# Patient Record
Sex: MALE | Race: BLACK OR AFRICAN AMERICAN | NOT HISPANIC OR LATINO | Employment: FULL TIME | URBAN - METROPOLITAN AREA
[De-identification: names, ages, dates, MRNs, and addresses within clinical notes are randomized per-mention and may not be internally consistent; named-entity substitution may affect disease eponyms.]

---

## 2017-11-26 ENCOUNTER — HOSPITAL ENCOUNTER (OUTPATIENT)
Dept: RADIOLOGY | Facility: MEDICAL CENTER | Age: 32
End: 2017-11-26

## 2017-11-26 ENCOUNTER — HOSPITAL ENCOUNTER (EMERGENCY)
Facility: MEDICAL CENTER | Age: 32
End: 2017-11-26
Attending: EMERGENCY MEDICINE
Payer: COMMERCIAL

## 2017-11-26 VITALS
WEIGHT: 240.3 LBS | HEART RATE: 84 BPM | TEMPERATURE: 97.8 F | SYSTOLIC BLOOD PRESSURE: 122 MMHG | RESPIRATION RATE: 15 BRPM | OXYGEN SATURATION: 94 % | DIASTOLIC BLOOD PRESSURE: 71 MMHG | BODY MASS INDEX: 30.84 KG/M2 | HEIGHT: 74 IN

## 2017-11-26 DIAGNOSIS — R07.9 CHEST PAIN, UNSPECIFIED TYPE: ICD-10-CM

## 2017-11-26 LAB
APTT PPP: 28.7 SEC (ref 24.7–36)
DEPRECATED D DIMER PPP IA-ACNC: <200 NG/ML(D-DU)
EKG IMPRESSION: NORMAL
INR PPP: 1.01 (ref 0.87–1.13)
LV EJECT FRACT  99904: 70
LV EJECT FRACT MOD 2C 99903: 75.26
LV EJECT FRACT MOD 4C 99902: 51.23
LV EJECT FRACT MOD BP 99901: 65.4
PROTHROMBIN TIME: 13 SEC (ref 12–14.6)
TROPONIN I SERPL-MCNC: <0.01 NG/ML (ref 0–0.04)

## 2017-11-26 PROCEDURE — 85379 FIBRIN DEGRADATION QUANT: CPT

## 2017-11-26 PROCEDURE — 93306 TTE W/DOPPLER COMPLETE: CPT | Mod: 26 | Performed by: INTERNAL MEDICINE

## 2017-11-26 PROCEDURE — 99284 EMERGENCY DEPT VISIT MOD MDM: CPT

## 2017-11-26 PROCEDURE — 93005 ELECTROCARDIOGRAM TRACING: CPT | Performed by: EMERGENCY MEDICINE

## 2017-11-26 PROCEDURE — 93306 TTE W/DOPPLER COMPLETE: CPT

## 2017-11-26 PROCEDURE — 85610 PROTHROMBIN TIME: CPT

## 2017-11-26 PROCEDURE — 85730 THROMBOPLASTIN TIME PARTIAL: CPT

## 2017-11-26 PROCEDURE — 36415 COLL VENOUS BLD VENIPUNCTURE: CPT

## 2017-11-26 PROCEDURE — 93005 ELECTROCARDIOGRAM TRACING: CPT

## 2017-11-26 PROCEDURE — 84484 ASSAY OF TROPONIN QUANT: CPT

## 2017-11-26 ASSESSMENT — PAIN SCALES - GENERAL: PAINLEVEL_OUTOF10: 0

## 2017-11-26 NOTE — ED NOTES
"33 y/o male ambulate to triage   Chief Complaint   Patient presents with   • Chest Pain     x 2 weeks \"off and on\"    • Sent by MD     ER in [place]      Pt states he had \"normal\" lab work in [place].  Was sent here for \"abnormal EKG\"  "

## 2017-11-26 NOTE — ED PROVIDER NOTES
"ED Provider Note    CHIEF COMPLAINT  Chief Complaint   Patient presents with   • Chest Pain     x 2 weeks \"off and on\"    • Sent by MD CORRIGAN in [facility]  Roberto Lopez is a 32 y.o. male who presents to the emergency departmentComplaining of chest pain. He is at intermittent chest pain for last 2 weeks. The pain occasionally sharp and occasionally is dull, there are no alleviating exacerbating factors, there is no radiation of the pain to his neck, to his doctors on his back. He was seen at the hospital in [place] earlier today and had an abnormal EKG and left AMA to come to our facility by private vehicle. He states since that time he has had one episode of chest discomfort that is dull in nature.    REVIEW OF SYSTEMS  Positives as above. Pertinent negatives include fever, cough, swelling of his lower extremities, rash, nausea, vomiting   All other review of systems are negative  Cardiac Risk Factors:  No Age > 65  No Aspirin use within 7 days  No prior history of coronary artery disease  No diabetes  No hyperlipidemia   No hypertension  No obesity  No family history of coronary artery disease at a young age <56 yo  No tobacco use   No drugs (methamphetamine or cocaine)  No history of aortic aneurysm   No history of aortic dissection   No history of deep vein thrombosis or pulmonary embolism     PAST MEDICAL HISTORY  History reviewed. No pertinent past medical history.    FAMILY HISTORY  Noncontributory    SOCIAL HISTORY  Social History     Social History   • Marital status:      Spouse name: N/A   • Number of children: N/A   • Years of education: N/A     Social History Main Topics   • Smoking status: Never Smoker   • Smokeless tobacco: Never Used   • Alcohol use Not on file   • Drug use: No   • Sexual activity: Not on file     Other Topics Concern   • Not on file     Social History Narrative   • No narrative on file       SURGICAL HISTORY  History reviewed. No pertinent surgical " "history.    CURRENT MEDICATIONS  Home Medications    **Home medications have not yet been reviewed for this encounter**         ALLERGIES  No Known Allergies    PHYSICAL EXAM  VITAL SIGNS: /71   Pulse 84   Temp 36.6 °C (97.8 °F)   Resp 15   Ht 1.88 m (6' 2\")   Wt 109 kg (240 lb 4.8 oz)   SpO2 94%   BMI 30.85 kg/m²      Constitutional: Well developed, Well nourished, No acute distress, Non-toxic appearance.   Eyes: PERRLA, EOMI, Conjunctiva normal, No discharge.   Cardiovascular: Normal heart rate, Normal rhythm, No murmurs, No rubs, No gallops, and intact distal pulses.   Thorax & Lungs:  No respiratory distress, no rales, no rhonchi, No wheezing, No chest wall tenderness.   Abdomen: Bowel sounds normal, Soft, No tenderness, No guarding, No rebound, No pulsatile masses.   Skin: Warm, Dry, No erythema, No rash.   Extremities: Full range of motion, no deformity, no edema.  Neurologic: Alert & oriented x 3, No focal deficits noted, acting appropriately on exam.  Psychiatric: Affect normal for clinical presentation.      RADIOLOGY/PROCEDURES  [facility]  Results for orders placed or performed during the hospital encounter of 17   TROPONIN   Result Value Ref Range    Troponin I <0.01 0.00 - 0.04 ng/mL   D-DIMER   Result Value Ref Range    D-Dimer Screen <200 <250 ng/mL(D-DU)   PROTHROMBIN TIME   Result Value Ref Range    PT 13.0 12.0 - 14.6 sec    INR 1.01 0.87 - 1.13   PTT   Result Value Ref Range    APTT 28.7 24.7 - 36.0 sec   EKG (NOW)   Result Value Ref Range    Report       [facility] Emergency Dept.    Test Date:  2017  Pt Name:    HERMELINDA ALBERT                Department: ER  MRN:        [id number]                      Room:  Gender:     M                            Technician: [id number]  :        1985                   Requested By:ER TRIAGE PROTOCOL  Order #:    [id number]                    Donnell MD: RAJI FRAGOSO" DO AMADOU    Measurements  Intervals                                Axis  Rate:       90                           P:          68  TN:         156                          QRS:        92  QRSD:       90                           T:          -41  QT:         340  QTc:        416    Interpretive Statements  SINUS RHYTHM  CONSIDER LEFT ATRIAL ABNORMALITY  BORDERLINE RIGHT AXIS DEVIATION  ABNORMAL T, CONSIDER ISCHEMIA, INFERIOR LEADS  No previous ECG available for comparison    Electronically Signed On 11- 17:21:52 PST by RAJI TOBAR DO     ECHOCARDIOGRAM COMP W/O CONT   Result Value Ref Range    Eject.Frac. MOD BP 65.4     Eject.Frac. MOD 4C 51.23     Eject.Frac. MOD 2C 75.26     Left Ventrical Ejection Fraction 70      No significant EKG changes from EKG completed earlier today at the outlying facility.  COURSE & MEDICAL DECISION MAKING  Pertinent Labs & Imaging studies reviewed. (See chart for details)  This is a [facility] 32-year-old male presents with chest discomfort, chest pain as well as abnormal EKG. He has have hyperacute T waves and J-point elevation in V2 through V4 as well as ST depression with inverted T waves in leads 2, 3 and aVF. I discussed this with cardiologist Dr. Christiano Vincent and he does believe the patient has early repolarization. He has for stat echocardiogram to be completed secondary to the patient's LVH is such a young age. He did find the patient did have evidence of apical hypertrophy. The patient did not want to take beta blockers and was evaluated with cardiology and states that he'll be following up with cardiology within one week. Discussed the patient Dr. Vincent who agrees the patient is appropriate for outpatient evaluation and management, we do not believe he is experiencing ischemia resulting in his EKG changes, the patient is a very low cardiac risk profile. The patient on reevaluation prior to discharge is asymptomatic, he understands the need to return to  the emergency department as increasing symptomatology and will be L following up with Dr. Vincent as an outpatient.  There are no discharge medications for this patient.      FINAL IMPRESSION     1. Chest pain, unspecified type       The patient will return for new or worsening symptoms and is stable at the time of discharge.    The patient is referred to a primary physician for blood pressure management, diabetic screening, and for all other preventative health concerns.    DISPOSITION:  Patient will be discharged home in stable condition.    FOLLOW UP:  Christiano Vincent M.D.  [address] [place] [address] [address]  [facility] [address]  [phone]    Schedule an appointment as soon as possible for a visit in 1 week      [facility], Emergency Dept  [address] [facility] [id number] [phone]    If symptoms worsen                 Electronically signed by: Antonio Boateng, 11/26/2017 11:57 AM

## 2017-11-26 NOTE — DISCHARGE INSTRUCTIONS
Chest Pain, Nonspecific  It is often hard to give a specific diagnosis for the cause of chest pain. There is always a chance that your pain could be related to something serious, like a heart attack or a blood clot in the lungs. You need to follow up with your caregiver for further evaluation. More lab tests or other studies such as X-rays, electrocardiography, stress testing, or cardiac imaging may be needed to find the cause of your pain.  Most of the time, nonspecific chest pain improves within 2 to 3 days with rest and mild pain medicine. For the next few days, avoid physical exertion or activities that bring on pain. Do not smoke. Avoid drinking alcohol. Call your caregiver for routine follow-up as advised.   SEEK IMMEDIATE MEDICAL CARE IF:  · You develop increased chest pain or pain that radiates to the arm, neck, jaw, back, or abdomen.   · You develop shortness of breath, increased coughing, or you start coughing up blood.   · You have severe back or abdominal pain, nausea, or vomiting.   · You develop severe weakness, fainting, fever, or chills.   Document Released: 12/18/2006 Document Revised: 03/11/2013 Document Reviewed: 06/06/2008  [employer]® Patient Information ©2013 [employer].

## 2017-11-26 NOTE — CONSULTS
"Cardiology New Consult Note    Date of note:    11/26/2017      Consulting Physician: Antonio Boateng D.O.    Patient ID:    Name:   Roberto Lopez   YOB: 1985  Age:   32 y.o.  male   MRN:   [id number]      Consult question: Evaluation of chest pain    HPI:  Roberto Lopez is a 32-year-old man with no past medical history who presents with 2 weeks of chest discomfort. He describes it as midsternal, sharp, lasting less than 5 minutes and occurring every 3-4 days. It does not radiate, and he can identify no other palliating or provoking factors including an especially exercise. He strictly denies any syncope, and has no other cardiovascular complaints.    ROS: All others reviewed and negative.    PMH (problem list reviewed, pertinent to this consultation):     1. Apical hypertrophic obstructive cardiomyopathy established this ER visit    Outpatient Medications:     None    Allergies: Patient has no known allergies.    Family History: family history is not on file. He has no family history of heart disease, and specifically denies any family history of sudden death.    Social History:  reports that he has never smoked. He has never used smokeless tobacco. He reports that he does not use drugs.    Physical Exam    Body mass index is 30.85 kg/m².  Blood pressure 122/71, pulse 82, temperature 36.6 °C (97.8 °F), resp. rate 17, height 1.88 m (6' 2\"), weight 109 kg (240 lb 4.8 oz), SpO2 95 %.  Vitals:    11/26/17 1103 11/26/17 1117 11/26/17 1130 11/26/17 1216   BP: 122/71      Pulse: (!) 102 93 82 82   Resp: 18 17 (!) 21 17   Temp:       SpO2: 98% 97% 96% 95%   Weight:       Height:         Oxygen Therapy:  Pulse Oximetry: 95 %, O2 Delivery: None (Room Air)    General:Pleasant, young, -American man in no distress  HEENT: No jugular venous distension sitting upright, pupils equal, round and reactive to light, extraocular movements intact  Heart: Normal rate, regular rhythm, no murmurs, no " rubs or gallops  Lungs: Clear to auscultation bilaterally  Abdomen: Bowel sounds positive, non tender, non distended, no masses   Extremities: No lower extremity edema, no clubbing, no cyanosis  Neurological: Alert and oriented x 3, no focal deficit  Skin: no rashes    Labs (reviewed and notable for):     CBC, 2017, outside labs: To be BC 4.2, hemoglobin 15.6, platelets 204  Chemistry panel, 2017, again outside labs: Sodium 142, potassium 4.1, chloride 100, CO2 26, BUN 12, creatinine 1.1, glucose 113  LFTs: AST 30, ALT 51, alkaline phosphatase 62, albumin 4.5, total protein 7.8, total bilirubin 0.3  TSH: 1.47  Cardiac markers: Troponin less than 0.01 ng/mL (again outside lab)    EKG: Shows sinus rhythm, left ventricular hypertrophy and repolarization abnormality with inferior ST depression and T-wave inversion    Echo: Read by me from today shows normal left ventricular ejection fraction, apical hypertrophy with a trivial apical gradient, and no valve disease      Impression and Medical Decision Makin. Hypertrophic obstructive cardiomyopathy: He is minimally symptomatic and has no high-risk features including family history of sudden cardiac death, syncope, or arrhythmia seen. I discussed with him the potential use of beta blockers for alleviation of his chest discomfort, and he preferred no medication at this point, which I do think is reasonable. I strictly reinforced that if he has any syncope especially with exercise he needs reevaluation immediately. He agreed, and will see us in clinic. I gave him my card.    He is from my perspective stable for discharge without additional workup.    This note was dictated using Dragon speech recognition software.    Thank you for allowing me to participate in the care of this patient.  Please call if any clarification will be helpful.      Christiano Vincent MD  Cardiologist, [facility] [place]

## 2017-11-26 NOTE — ED NOTES
Patient denies pain on arrival to ER but reports intermittent non radiating chest pain x 2 weeks. Pain typically lasts 5-10 minutes then resolves.

## 2021-09-21 ENCOUNTER — APPOINTMENT (OUTPATIENT)
Dept: RADIOLOGY | Facility: MEDICAL CENTER | Age: 36
End: 2021-09-21
Attending: EMERGENCY MEDICINE
Payer: COMMERCIAL

## 2021-09-21 ENCOUNTER — APPOINTMENT (OUTPATIENT)
Dept: RADIOLOGY | Facility: MEDICAL CENTER | Age: 36
End: 2021-09-21
Payer: COMMERCIAL

## 2021-09-21 ENCOUNTER — HOSPITAL ENCOUNTER (EMERGENCY)
Facility: MEDICAL CENTER | Age: 36
End: 2021-09-21
Attending: EMERGENCY MEDICINE
Payer: COMMERCIAL

## 2021-09-21 VITALS
HEIGHT: 74 IN | BODY MASS INDEX: 30.81 KG/M2 | HEART RATE: 90 BPM | TEMPERATURE: 98.4 F | WEIGHT: 240.08 LBS | OXYGEN SATURATION: 99 % | RESPIRATION RATE: 16 BRPM | SYSTOLIC BLOOD PRESSURE: 124 MMHG | DIASTOLIC BLOOD PRESSURE: 86 MMHG

## 2021-09-21 DIAGNOSIS — R07.9 ACUTE CHEST PAIN: ICD-10-CM

## 2021-09-21 LAB
ALBUMIN SERPL BCP-MCNC: 4.7 G/DL (ref 3.2–4.9)
ALBUMIN/GLOB SERPL: 1.5 G/DL
ALP SERPL-CCNC: 69 U/L (ref 30–99)
ALT SERPL-CCNC: 31 U/L (ref 2–50)
ANION GAP SERPL CALC-SCNC: 12 MMOL/L (ref 7–16)
AST SERPL-CCNC: 23 U/L (ref 12–45)
BASOPHILS # BLD AUTO: 0.5 % (ref 0–1.8)
BASOPHILS # BLD: 0.03 K/UL (ref 0–0.12)
BILIRUB SERPL-MCNC: 0.4 MG/DL (ref 0.1–1.5)
BUN SERPL-MCNC: 10 MG/DL (ref 8–22)
CALCIUM SERPL-MCNC: 9.6 MG/DL (ref 8.5–10.5)
CHLORIDE SERPL-SCNC: 101 MMOL/L (ref 96–112)
CO2 SERPL-SCNC: 26 MMOL/L (ref 20–33)
CREAT SERPL-MCNC: 1.08 MG/DL (ref 0.5–1.4)
EKG IMPRESSION: NORMAL
EOSINOPHIL # BLD AUTO: 0.05 K/UL (ref 0–0.51)
EOSINOPHIL NFR BLD: 0.9 % (ref 0–6.9)
ERYTHROCYTE [DISTWIDTH] IN BLOOD BY AUTOMATED COUNT: 41.8 FL (ref 35.9–50)
GLOBULIN SER CALC-MCNC: 3.2 G/DL (ref 1.9–3.5)
GLUCOSE SERPL-MCNC: 98 MG/DL (ref 65–99)
HCT VFR BLD AUTO: 44.3 % (ref 42–52)
HGB BLD-MCNC: 15.3 G/DL (ref 14–18)
IMM GRANULOCYTES # BLD AUTO: 0.01 K/UL (ref 0–0.11)
IMM GRANULOCYTES NFR BLD AUTO: 0.2 % (ref 0–0.9)
LYMPHOCYTES # BLD AUTO: 2.09 K/UL (ref 1–4.8)
LYMPHOCYTES NFR BLD: 37.3 % (ref 22–41)
MCH RBC QN AUTO: 28.2 PG (ref 27–33)
MCHC RBC AUTO-ENTMCNC: 34.5 G/DL (ref 33.7–35.3)
MCV RBC AUTO: 81.7 FL (ref 81.4–97.8)
MONOCYTES # BLD AUTO: 0.36 K/UL (ref 0–0.85)
MONOCYTES NFR BLD AUTO: 6.4 % (ref 0–13.4)
NEUTROPHILS # BLD AUTO: 3.07 K/UL (ref 1.82–7.42)
NEUTROPHILS NFR BLD: 54.7 % (ref 44–72)
NRBC # BLD AUTO: 0 K/UL
NRBC BLD-RTO: 0 /100 WBC
PLATELET # BLD AUTO: 227 K/UL (ref 164–446)
PMV BLD AUTO: 10.3 FL (ref 9–12.9)
POTASSIUM SERPL-SCNC: 3.7 MMOL/L (ref 3.6–5.5)
PROT SERPL-MCNC: 7.9 G/DL (ref 6–8.2)
RBC # BLD AUTO: 5.42 M/UL (ref 4.7–6.1)
SODIUM SERPL-SCNC: 139 MMOL/L (ref 135–145)
TROPONIN T SERPL-MCNC: 8 NG/L (ref 6–19)
WBC # BLD AUTO: 5.6 K/UL (ref 4.8–10.8)

## 2021-09-21 PROCEDURE — 93005 ELECTROCARDIOGRAM TRACING: CPT | Performed by: EMERGENCY MEDICINE

## 2021-09-21 PROCEDURE — 99283 EMERGENCY DEPT VISIT LOW MDM: CPT

## 2021-09-21 PROCEDURE — 84484 ASSAY OF TROPONIN QUANT: CPT

## 2021-09-21 PROCEDURE — 85025 COMPLETE CBC W/AUTO DIFF WBC: CPT

## 2021-09-21 PROCEDURE — 93005 ELECTROCARDIOGRAM TRACING: CPT

## 2021-09-21 PROCEDURE — 71045 X-RAY EXAM CHEST 1 VIEW: CPT

## 2021-09-21 PROCEDURE — 80053 COMPREHEN METABOLIC PANEL: CPT

## 2021-09-21 ASSESSMENT — LIFESTYLE VARIABLES: DO YOU DRINK ALCOHOL: NO

## 2021-09-21 NOTE — ED TRIAGE NOTES
"Chief Complaint   Patient presents with   • Chest Pain     Pt reports sharp, upper mid/left chest pain and pressure that does not radiate. Pt reports pain is worse on inspiration, swallowing, bending or turning his neck, 9/10.      /86   Pulse 100   Temp 36.7 °C (98.1 °F) (Temporal)   Resp 16   Ht 1.88 m (6' 2\")   Wt 109 kg (240 lb 1.3 oz)   SpO2 99%   BMI 30.82 kg/m²      Pt is ambulatory in and out of triage with a steady gait. Appropriate PPE worn throughout entire encounter. Pt placed back in the lobby and educated about triage process.  EKG performed in triage.   "

## 2021-09-22 NOTE — ED PROVIDER NOTES
ED Provider Note    CHIEF COMPLAINT  Chief Complaint   Patient presents with   • Chest Pain     Pt reports sharp, upper mid/left chest pain and pressure that does not radiate. Pt reports pain is worse on inspiration, swallowing, bending or turning his neck, 9/10.         HPI    Primary care provider: Pcp Pt States None   History obtained from: Patient  History limited by: None     Roberto Lopez is a 36 y.o. male who presents to the ED complaining of mid to left upper chest pain described as sharp that started early this morning while he was at work.  He reports pain also in his neck and feels like he has difficulty swallowing.  He states that the pain is worse when he is trying to take a deep breath, swallow or bending forward.  He denies recent injury or trauma.  Denies fever/cough/shortness of breath or difficulty breathing/nausea/vomiting/diarrhea/dysuria/rash/swelling.  He reports that he otherwise feels pretty good.  He denies known history of heart problems or heart problems in the family.  No history of blood clots.  He reports that he does suffer from chronic sinus issues for the past several years and frequently has postnasal drip down into his throat that causes him to have throat discomfort.  He did see the ENT a few weeks ago and no procedure was performed.    REVIEW OF SYSTEMS  Please see HPI for pertinent positives/negatives.  All other systems reviewed and are negative.     PAST MEDICAL HISTORY  No past medical history on file.     SURGICAL HISTORY  History reviewed. No pertinent surgical history.     SOCIAL HISTORY  Social History     Tobacco Use   • Smoking status: Never Smoker   • Smokeless tobacco: Never Used   Vaping Use   • Vaping Use: Never used   Substance and Sexual Activity   • Alcohol use: Yes     Comment: occasionally   • Drug use: No   • Sexual activity: Not on file        FAMILY HISTORY  Family History   Problem Relation Age of Onset   • Heart Disease Neg Hx         CURRENT  "MEDICATIONS  Home Medications     Reviewed by Veronica Dee R.N. (Registered Nurse) on 09/21/21 at 1500  Med List Status: Not Addressed   Medication Last Dose Status        Patient Royal Taking any Medications                        ALLERGIES  No Known Allergies     PHYSICAL EXAM  VITAL SIGNS: /86   Pulse 90   Temp 36.9 °C (98.4 °F) (Temporal)   Resp 16   Ht 1.88 m (6' 2\")   Wt 109 kg (240 lb 1.3 oz)   SpO2 99%   BMI 30.82 kg/m²  @FRANK[[id number]::@     Pulse ox interpretation: 99% I interpret this pulse ox as normal     Cardiac monitor interpretation: Sinus rhythm with heart rate in the 90s as interpreted by me.  The patient presented with chest pain and cardiac monitor was ordered to monitor for dysrhythmia.    Constitutional: Well developed, well nourished, alert in no apparent distress, nontoxic appearance    HENT: No external signs of trauma, normocephalic, mask on due to COVID-19 pandemic  Eyes: PERRL, conjunctiva without erythema, no discharge, no icterus    Neck: Soft and supple, trachea midline, no stridor, no tenderness, no LAD, no JVD, good ROM    Cardiovascular: Regular rate and rhythm, no murmurs/rubs/gallops, strong distal pulses and good perfusion    Thorax & Lungs: No respiratory distress, CTAB   Abdomen: Soft, nontender, nondistended, no guarding, no rebound, normal BS    Back: No CVAT    Extremities: No cyanosis, no edema, no gross deformity, good ROM, no tenderness, intact distal pulses with brisk cap refill    Skin: Warm, dry, no pallor/cyanosis, no rash noted    Lymphatic: No lymphadenopathy noted    Neuro: A/O times 3, no focal deficits noted    Psychiatric: Cooperative, normal mood and affect, normal judgement, appropriate for clinical situation        DIAGNOSTIC STUDIES / PROCEDURES    EKG  12 Lead EKG obtained at 1452 and interpreted by me:   Rate: 86   Rhythm: Sinus rhythm   Ectopy: None  Intervals: Normal   Axis: RAD  QRS: Normal   ST segments: Normal  T Waves: " Normal    Clinical Impression: Sinus rhythm without acute ischemic changes or dysrhythmia       LABS  All labs reviewed by me.     Results for orders placed or performed during the hospital encounter of 09/21/21   CBC with Differential   Result Value Ref Range    WBC 5.6 4.8 - 10.8 K/uL    RBC 5.42 4.70 - 6.10 M/uL    Hemoglobin 15.3 14.0 - 18.0 g/dL    Hematocrit 44.3 42.0 - 52.0 %    MCV 81.7 81.4 - 97.8 fL    MCH 28.2 27.0 - 33.0 pg    MCHC 34.5 33.7 - 35.3 g/dL    RDW 41.8 35.9 - 50.0 fL    Platelet Count 227 164 - 446 K/uL    MPV 10.3 9.0 - 12.9 fL    Neutrophils-Polys 54.70 44.00 - 72.00 %    Lymphocytes 37.30 22.00 - 41.00 %    Monocytes 6.40 0.00 - 13.40 %    Eosinophils 0.90 0.00 - 6.90 %    Basophils 0.50 0.00 - 1.80 %    Immature Granulocytes 0.20 0.00 - 0.90 %    Nucleated RBC 0.00 /100 WBC    Neutrophils (Absolute) 3.07 1.82 - 7.42 K/uL    Lymphs (Absolute) 2.09 1.00 - 4.80 K/uL    Monos (Absolute) 0.36 0.00 - 0.85 K/uL    Eos (Absolute) 0.05 0.00 - 0.51 K/uL    Baso (Absolute) 0.03 0.00 - 0.12 K/uL    Immature Granulocytes (abs) 0.01 0.00 - 0.11 K/uL    NRBC (Absolute) 0.00 K/uL   Complete Metabolic Panel (CMP)   Result Value Ref Range    Sodium 139 135 - 145 mmol/L    Potassium 3.7 3.6 - 5.5 mmol/L    Chloride 101 96 - 112 mmol/L    Co2 26 20 - 33 mmol/L    Anion Gap 12.0 7.0 - 16.0    Glucose 98 65 - 99 mg/dL    Bun 10 8 - 22 mg/dL    Creatinine 1.08 0.50 - 1.40 mg/dL    Calcium 9.6 8.5 - 10.5 mg/dL    AST(SGOT) 23 12 - 45 U/L    ALT(SGPT) 31 2 - 50 U/L    Alkaline Phosphatase 69 30 - 99 U/L    Total Bilirubin 0.4 0.1 - 1.5 mg/dL    Albumin 4.7 3.2 - 4.9 g/dL    Total Protein 7.9 6.0 - 8.2 g/dL    Globulin 3.2 1.9 - 3.5 g/dL    A-G Ratio 1.5 g/dL   Troponin   Result Value Ref Range    Troponin T 8 6 - 19 ng/L   ESTIMATED GFR   Result Value Ref Range    GFR If African American >60 >60 mL/min/1.73 m 2    GFR If Non African American >60 >60 mL/min/1.73 m 2   EKG   Result Value Ref Range    Report        [facility] Emergency Dept.    Test Date:  2021  Pt Name:    HERMELINDA ALBERT                Department: ER  MRN:        [id number]                      Room:  Gender:     Male                         Technician: TCM  :        1985                   Requested By:ER TRIAGE PROTOCOL  Order #:    [id number]                    Reading MD:    Measurements  Intervals                                Axis  Rate:       86                           P:          75  AK:         164                          QRS:        95  QRSD:       82                           T:          4  QT:         352  QTc:        421    Interpretive Statements  SINUS RHYTHM  BORDERLINE RIGHT AXIS DEVIATION  Compared to ECG 2017 10:37:59  T-wave abnormality no longer present  Possible ischemia no longer present          RADIOLOGY  The radiologist's interpretation of all radiological studies have been reviewed by me.     DX-CHEST-PORTABLE (1 VIEW)   Final Result      No acute cardiopulmonary process is seen.             COURSE & MEDICAL DECISION MAKING  Nursing notes, VS, PMSFHx reviewed in chart.     Review of past medical records shows the patient was last seen in this ED 2017 for chest pain and had stat echo performed which showed apical hypertrophy.  Patient was to follow-up with cardiology.      Differential diagnoses considered include but are not limited to: AMI, dissection, PE, pneumothorax, pneumomediastinum, CHF/pulm edema, pericardial effusion/tamponade, myocarditis, pericarditis, pleurisy, costochondritis, mediastinitis, esophageal spasm, GERD, gastritis, PUD, hiatal hernia, pancreatitis, muscle strain, neuropathy      The patient was ruled out for PE by PERC criteria:    Age < 50  HR < 100  RA sat > 94%  No hx of DVT/PE  No hemoptysis  No recent surgery/trauma (4 weeks)  No estrogen/BCP  No unilateral leg swelling        Pt risk-stratified as low risk for MACE in the next 6 weeks by HEART Score  (0-3): 1    HISTORY  Highly suspicious +2  Moderately suspicious +1  Slightly suspicious 0    EKG  Significant ST depression +2  Nonspecific repolarization disturbance +1  Normal 0    AGE  ? 65 +2  45-65 +1  < 45 0    RISK FACTORS  Hypercholesterolemia, HTN, DM, Cigarette smoking, positive family history, obesity  ? 3 risk factors or history of atherosclerotic disease +2  1-2 risk factors +1  No risk factors known 0    TROPONIN  ? 3× normal limit +2  1-3× normal limit +1  ? normal limit 0       History and physical exam as above. EKG, labs and chest x-ray were all unremarkable as above. I discussed the findings with the patient. This is a very pleasant well-appearing patient in no acute distress and nontoxic in appearance with a benign exam and has been clinically stable during his ED stay. He declined CT neck which I think is reasonable. At this point, I have low clinical suspicion for serious acute pathology given the history/exam/findings. He was advised on outpatient follow-up and given return to ED precautions. He verbalized understanding and agreed with plan of care with no further questions or concerns.      The patient is referred to a primary physician for blood pressure management, diabetic screening, and for all other preventative health concerns.       FINAL IMPRESSION  1. Acute chest pain Acute          DISPOSITION  Patient will be discharged home in stable condition.       FOLLOW UP  Please follow-up with your doctor    Call in 1 day      [facility], Emergency Dept  [address] [place] [id number] [phone]    If symptoms worsen         OUTPATIENT MEDICATIONS  There are no discharge medications for this patient.         Electronically signed by: Madhav Obregon D.O., 9/21/2021 5:01 PM      Portions of this record were made with voice recognition software.  Despite my review, spelling/grammar/context errors may still remain.  Interpretation of this chart should be taken in  this context.

## 2022-02-01 ENCOUNTER — HOSPITAL ENCOUNTER (OUTPATIENT)
Facility: MEDICAL CENTER | Age: 37
End: 2022-02-01
Attending: OTOLARYNGOLOGY
Payer: COMMERCIAL

## 2022-02-01 LAB
GRAM STN SPEC: NORMAL
SIGNIFICANT IND 70042: NORMAL
SITE SITE: NORMAL
SOURCE SOURCE: NORMAL

## 2022-02-01 PROCEDURE — 87205 SMEAR GRAM STAIN: CPT

## 2022-02-01 PROCEDURE — 87186 SC STD MICRODIL/AGAR DIL: CPT

## 2022-02-01 PROCEDURE — 87077 CULTURE AEROBIC IDENTIFY: CPT

## 2022-02-01 PROCEDURE — 87070 CULTURE OTHR SPECIMN AEROBIC: CPT

## 2022-02-03 LAB
BACTERIA WND AEROBE CULT: ABNORMAL
BACTERIA WND AEROBE CULT: ABNORMAL
GRAM STN SPEC: ABNORMAL
SIGNIFICANT IND 70042: ABNORMAL
SITE SITE: ABNORMAL
SOURCE SOURCE: ABNORMAL

## 2022-03-04 ENCOUNTER — OFFICE VISIT (OUTPATIENT)
Dept: URGENT CARE | Facility: CLINIC | Age: 37
End: 2022-03-04
Payer: COMMERCIAL

## 2022-03-04 VITALS
SYSTOLIC BLOOD PRESSURE: 120 MMHG | OXYGEN SATURATION: 98 % | WEIGHT: 240 LBS | TEMPERATURE: 97.2 F | DIASTOLIC BLOOD PRESSURE: 80 MMHG | HEIGHT: 73 IN | BODY MASS INDEX: 31.81 KG/M2 | HEART RATE: 92 BPM | RESPIRATION RATE: 16 BRPM

## 2022-03-04 DIAGNOSIS — H10.31 ACUTE CONJUNCTIVITIS OF RIGHT EYE, UNSPECIFIED ACUTE CONJUNCTIVITIS TYPE: ICD-10-CM

## 2022-03-04 PROCEDURE — 99203 OFFICE O/P NEW LOW 30 MIN: CPT | Performed by: NURSE PRACTITIONER

## 2022-03-04 RX ORDER — POLYMYXIN B SULFATE AND TRIMETHOPRIM 1; 10000 MG/ML; [USP'U]/ML
1 SOLUTION OPHTHALMIC EVERY 4 HOURS
Qty: 10 ML | Refills: 0 | Status: SHIPPED | OUTPATIENT
Start: 2022-03-04 | End: 2022-03-11

## 2022-03-04 ASSESSMENT — ENCOUNTER SYMPTOMS
FEVER: 0
EYE ITCHING: 1
FOREIGN BODY SENSATION: 1
BLURRED VISION: 1
EYE DISCHARGE: 0
EYE REDNESS: 1
CONSTITUTIONAL NEGATIVE: 1
EYE PAIN: 1

## 2022-03-04 ASSESSMENT — FIBROSIS 4 INDEX: FIB4 SCORE: 0.62

## 2022-03-04 ASSESSMENT — VISUAL ACUITY: OU: 1

## 2022-03-05 NOTE — PROGRESS NOTES
TriedSubjective:     Roberto Lopez is a 36 y.o. male who presents for Eye Problem (X 3 days, bilateral eye redness, pain and blurred vision)       Eye Problem   Both eyes are affected.This is a new problem. The problem has been gradually worsening. Associated symptoms include blurred vision, eye redness, a foreign body sensation and itching. Pertinent negatives include no eye discharge or fever.     Patient reports on Wednesday, he noticed that his eyelashes were touching his right eye.  His wife to extract eyelashes.  However, the following day, started to notice worsening right eye redness, pain, and blurry vision.  Has a foreign body sensation.  Has lesser symptoms on the left side.  Reports he went to [place] urgent care and was evaluated there.  Reports he continued to have symptoms and comes in for further evaluation here.    Patient was screened prior to rooming and denied COVID-19 diagnosis or contact with a person who has been diagnosed or is suspected to have COVID-19. During this visit, appropriate PPE was worn, hand hygiene was performed, and the patient and any visitors were masked.     PMH:  has no past medical history on file.    MEDS:   Current Outpatient Medications:   •  polymixin-trimethoprim (POLYTRIM) 40803-8.1 UNIT/ML-% Solution, Administer 1 Drop into both eyes every 4 hours for 7 days., Disp: 10 mL, Rfl: 0    ALLERGIES: No Known Allergies    SURGHX: History reviewed. No pertinent surgical history.    SOCHX:  reports that he has never smoked. He has never used smokeless tobacco. He reports current alcohol use. He reports that he does not use drugs.     FH: Reviewed with patient, not pertinent to this visit.    Review of Systems   Constitutional: Negative.  Negative for fever.   Eyes: Positive for blurred vision, pain, redness and itching. Negative for discharge.   All other systems reviewed and are negative.    Additional details per HPI.      Objective:     /80 (BP Location:  "Left arm, Patient Position: Sitting, BP Cuff Size: Large adult)   Pulse 92   Temp 36.2 °C (97.2 °F) (Temporal)   Resp 16   Ht 1.854 m (6' 1\")   Wt 109 kg (240 lb)   SpO2 98%   BMI 31.66 kg/m²     Physical Exam  Vitals reviewed.   Constitutional:       General: He is not in acute distress.     Appearance: He is well-developed. He is not ill-appearing or toxic-appearing.   Eyes:      General: Lids are everted, no foreign bodies appreciated. Vision grossly intact.         Right eye: No discharge.         Left eye: No discharge.      Extraocular Movements: Extraocular movements intact.      Conjunctiva/sclera:      Right eye: Right conjunctiva is injected. No chemosis.     Left eye: Left conjunctiva is not injected. No chemosis.     Pupils: Pupils are equal, round, and reactive to light.      Right eye: Corneal abrasion and fluorescein uptake present. Gaurav exam negative.      Left eye: No corneal abrasion or fluorescein uptake. Gaurav exam negative.     Comments: Right eye: there appear to be 4 small, short eyelash hairs, likely residual from attempted removal at home, pointed towards eye. Left eye: there appears to be an eyelash angled towards the left eye.   Cardiovascular:      Rate and Rhythm: Normal rate.   Pulmonary:      Effort: Pulmonary effort is normal. No respiratory distress.   Musculoskeletal:         General: No deformity. Normal range of motion.      Cervical back: Normal range of motion.   Skin:     General: Skin is warm and dry.      Coloration: Skin is not pale.   Neurological:      Mental Status: He is alert and oriented to person, place, and time.      Sensory: No sensory deficit.      Motor: No weakness.   Psychiatric:         Behavior: Behavior normal. Behavior is cooperative.       Assessment/Plan:     1. Acute conjunctivitis of right eye, unspecified acute conjunctivitis type  - polymixin-trimethoprim (POLYTRIM) 90953-7.1 UNIT/ML-% Solution; Administer 1 Drop into both eyes every 4 hours " for 7 days.  Dispense: 10 mL; Refill: 0    Fluorescein exam of eye performed bilaterally.  Superficial corneal abrasion noted to right eye.  No foreign body identified.    Using splinter forceps, offending eyelashes extracted from right upper eyelid.  One offending eyelash extracted from left upper eyelid.  Patient reports immediate improvement in symptoms.  Is able to blink and move his eyes around without feeling discomfort.    Rx as above sent electronically.  May use OTC artificial lubricating tears as needed.    Vital signs stable, afebrile, no acute distress at this time. Warning signs reviewed. Return precautions discussed.     Differential diagnosis, natural history, supportive care, over-the-counter symptom management per 's instructions, close monitoring, and indications for immediate follow-up discussed.     All questions answered. Patient agrees with the plan of care.    Discharge summary provided through [employer].

## 2022-03-05 NOTE — PATIENT INSTRUCTIONS
"Conjunctivitis  Conjunctivitis is commonly called \"pink eye.\" Conjunctivitis can be caused by bacterial or viral infection, allergies, or injuries. There is usually redness of the lining of the eye, itching, discomfort, and sometimes discharge. There may be deposits of matter along the eyelids. A viral infection usually causes a watery discharge, while a bacterial infection causes a yellowish, thick discharge. Pink eye is very contagious and spreads by direct contact.  You may be given antibiotic eyedrops as part of your treatment. Before using your eye medicine, remove all drainage from the eye by washing gently with warm water and cotton balls. Continue to use the medication until you have awakened 2 mornings in a row without discharge from the eye. Do not rub your eye. This increases the irritation and helps spread infection. Use separate towels from other household members. Wash your hands with soap and water before and after touching your eyes. Use cold compresses to reduce pain and sunglasses to relieve irritation from light. Do not wear contact lenses or wear eye makeup until the infection is gone.  SEEK MEDICAL CARE IF:   · Your symptoms are not better after 3 days of treatment.  · You have increased pain or trouble seeing.  · The outer eyelids become very red or swollen.  Document Released: 01/25/2006 Document Revised: 03/11/2013 Document Reviewed: 12/18/2006  [employer]® Patient Information ©2014 [employer].    "

## 2022-05-23 ENCOUNTER — HOSPITAL ENCOUNTER (OUTPATIENT)
Facility: MEDICAL CENTER | Age: 37
End: 2022-05-23
Attending: OTOLARYNGOLOGY
Payer: COMMERCIAL

## 2022-05-23 LAB
GRAM STN SPEC: NORMAL
SIGNIFICANT IND 70042: NORMAL
SITE SITE: NORMAL
SOURCE SOURCE: NORMAL

## 2022-05-23 PROCEDURE — 87070 CULTURE OTHR SPECIMN AEROBIC: CPT

## 2022-05-23 PROCEDURE — 87205 SMEAR GRAM STAIN: CPT

## 2022-05-23 PROCEDURE — 87186 SC STD MICRODIL/AGAR DIL: CPT

## 2022-05-23 PROCEDURE — 87077 CULTURE AEROBIC IDENTIFY: CPT

## 2022-05-23 PROCEDURE — 87075 CULTR BACTERIA EXCEPT BLOOD: CPT

## 2022-05-26 LAB
BACTERIA SPEC ANAEROBE CULT: NORMAL
SIGNIFICANT IND 70042: NORMAL
SITE SITE: NORMAL
SOURCE SOURCE: NORMAL

## 2022-10-13 ENCOUNTER — HOSPITAL ENCOUNTER (EMERGENCY)
Facility: MEDICAL CENTER | Age: 37
End: 2022-10-13
Attending: EMERGENCY MEDICINE
Payer: COMMERCIAL

## 2022-10-13 ENCOUNTER — APPOINTMENT (OUTPATIENT)
Dept: RADIOLOGY | Facility: MEDICAL CENTER | Age: 37
End: 2022-10-13
Attending: EMERGENCY MEDICINE
Payer: COMMERCIAL

## 2022-10-13 VITALS
WEIGHT: 233.47 LBS | TEMPERATURE: 98.4 F | OXYGEN SATURATION: 99 % | BODY MASS INDEX: 29.96 KG/M2 | SYSTOLIC BLOOD PRESSURE: 125 MMHG | HEIGHT: 74 IN | HEART RATE: 79 BPM | RESPIRATION RATE: 16 BRPM | DIASTOLIC BLOOD PRESSURE: 69 MMHG

## 2022-10-13 DIAGNOSIS — R07.9 CHEST PAIN, UNSPECIFIED TYPE: ICD-10-CM

## 2022-10-13 LAB
ALBUMIN SERPL BCP-MCNC: 4.7 G/DL (ref 3.2–4.9)
ALBUMIN/GLOB SERPL: 1.5 G/DL
ALP SERPL-CCNC: 71 U/L (ref 30–99)
ALT SERPL-CCNC: 15 U/L (ref 2–50)
ANION GAP SERPL CALC-SCNC: 13 MMOL/L (ref 7–16)
AST SERPL-CCNC: 20 U/L (ref 12–45)
BASOPHILS # BLD AUTO: 0.2 % (ref 0–1.8)
BASOPHILS # BLD: 0.01 K/UL (ref 0–0.12)
BILIRUB SERPL-MCNC: 0.4 MG/DL (ref 0.1–1.5)
BUN SERPL-MCNC: 10 MG/DL (ref 8–22)
CALCIUM SERPL-MCNC: 9.1 MG/DL (ref 8.5–10.5)
CHLORIDE SERPL-SCNC: 103 MMOL/L (ref 96–112)
CO2 SERPL-SCNC: 23 MMOL/L (ref 20–33)
CREAT SERPL-MCNC: 0.96 MG/DL (ref 0.5–1.4)
EKG IMPRESSION: NORMAL
EOSINOPHIL # BLD AUTO: 0.06 K/UL (ref 0–0.51)
EOSINOPHIL NFR BLD: 1.4 % (ref 0–6.9)
ERYTHROCYTE [DISTWIDTH] IN BLOOD BY AUTOMATED COUNT: 42.5 FL (ref 35.9–50)
GFR SERPLBLD CREATININE-BSD FMLA CKD-EPI: 106 ML/MIN/1.73 M 2
GLOBULIN SER CALC-MCNC: 3.1 G/DL (ref 1.9–3.5)
GLUCOSE SERPL-MCNC: 127 MG/DL (ref 65–99)
HCT VFR BLD AUTO: 44.6 % (ref 42–52)
HGB BLD-MCNC: 15.2 G/DL (ref 14–18)
IMM GRANULOCYTES # BLD AUTO: 0.01 K/UL (ref 0–0.11)
IMM GRANULOCYTES NFR BLD AUTO: 0.2 % (ref 0–0.9)
LYMPHOCYTES # BLD AUTO: 2.13 K/UL (ref 1–4.8)
LYMPHOCYTES NFR BLD: 51 % (ref 22–41)
MCH RBC QN AUTO: 28.5 PG (ref 27–33)
MCHC RBC AUTO-ENTMCNC: 34.1 G/DL (ref 33.7–35.3)
MCV RBC AUTO: 83.5 FL (ref 81.4–97.8)
MONOCYTES # BLD AUTO: 0.27 K/UL (ref 0–0.85)
MONOCYTES NFR BLD AUTO: 6.5 % (ref 0–13.4)
NEUTROPHILS # BLD AUTO: 1.7 K/UL (ref 1.82–7.42)
NEUTROPHILS NFR BLD: 40.7 % (ref 44–72)
NRBC # BLD AUTO: 0 K/UL
NRBC BLD-RTO: 0 /100 WBC
PLATELET # BLD AUTO: 247 K/UL (ref 164–446)
PMV BLD AUTO: 11.1 FL (ref 9–12.9)
POTASSIUM SERPL-SCNC: 3.9 MMOL/L (ref 3.6–5.5)
PROT SERPL-MCNC: 7.8 G/DL (ref 6–8.2)
RBC # BLD AUTO: 5.34 M/UL (ref 4.7–6.1)
SODIUM SERPL-SCNC: 139 MMOL/L (ref 135–145)
TROPONIN T SERPL-MCNC: 6 NG/L (ref 6–19)
WBC # BLD AUTO: 4.2 K/UL (ref 4.8–10.8)

## 2022-10-13 PROCEDURE — 85025 COMPLETE CBC W/AUTO DIFF WBC: CPT

## 2022-10-13 PROCEDURE — 80053 COMPREHEN METABOLIC PANEL: CPT

## 2022-10-13 PROCEDURE — 71045 X-RAY EXAM CHEST 1 VIEW: CPT

## 2022-10-13 PROCEDURE — 99283 EMERGENCY DEPT VISIT LOW MDM: CPT

## 2022-10-13 PROCEDURE — 93005 ELECTROCARDIOGRAM TRACING: CPT | Performed by: EMERGENCY MEDICINE

## 2022-10-13 PROCEDURE — 84484 ASSAY OF TROPONIN QUANT: CPT

## 2022-10-13 PROCEDURE — 36415 COLL VENOUS BLD VENIPUNCTURE: CPT

## 2022-10-13 PROCEDURE — 93005 ELECTROCARDIOGRAM TRACING: CPT

## 2022-10-13 ASSESSMENT — FIBROSIS 4 INDEX: FIB4 SCORE: 0.64

## 2022-10-14 NOTE — ED NOTES
Pt ambulated to room with steady gait. Pt changed into gown and placed on monitors. Chart up for ERP.

## 2022-10-14 NOTE — ED TRIAGE NOTES
"Chief Complaint   Patient presents with    Chest Pain     Worsened 2 days ago. Stabbing pain radiating from L to R side of chest.    Shortness of Breath     Started w/ chest pain.     /71   Pulse 81   Temp 36.9 °C (98.4 °F) (Temporal)   Resp 16   Ht 1.88 m (6' 2\")   Wt 106 kg (233 lb 7.5 oz)   SpO2 98%   BMI 29.98 kg/m²     "

## 2022-10-14 NOTE — ED PROVIDER NOTES
ED Provider Note    Scribed for Mich Garcia M.D. by Zina Crane. 10/13/2022,  6:35 PM.    Means of Arrival: Walk in   History obtained from: Patient  History limited by: None    CHIEF COMPLAINT  Chief Complaint   Patient presents with    Chest Pain     Worsened 2 days ago. Stabbing pain radiating from L to R side of chest.    Shortness of Breath     Started w/ chest pain.       [facility]  Roberto Lopez is a 37 y.o. male who presents to the Emergency Department for evaluation of consistent shortness of breath onset July. Patient reports that he has had severe sinus congestion secondary to the higher elevation and dry climate; he is from [place]. So he has to flush his sinus multiple times everyday. Due to the sinus congestion, patient has to breath through his mouth constantly which contributes to his shortness of breath. Roberto saw ENT in the past who ordered a chest X-ray, and the X-ray was normal. Patient was referred to pulmonology, however the earliest appointment he could get was March 2023. Patient also reports of intermittent chest pain with the shortness of breath. Recently, he experienced chest pain two days ago as well as today at 3PM. The chest pain today was a stabbing pain, and he rates it a 3/10. He went to Urgent Care today, and his chest-Xray was normal. The physician prompted him to come to the ED today because his EKG was abnormal. He denies calf pain, leg pain, cough, abdominal pain, or dysuria. No alleviating factors were reported.       REVIEW OF SYSTEMS  CARDIOVASCULAR:  chest pain.  HENT: Sinus congestion.   RESPIRATORY:  Shortness of breath. No cough. No pleuritic chest pain.  GASTROINTESTINAL:  No abdominal pain.  GENITOURINARY:   No dysuria.  MUSCULOSKELETAL:  No calf pain. No leg pain. No arthralgia.  See HPI for further details.   All other systems are negative.     PAST MEDICAL HISTORY  History reviewed. No pertinent past medical history.    FAMILY HISTORY  Family History  "  Problem Relation Age of Onset    Heart Disease Neg Hx        SOCIAL HISTORY   reports that he has never smoked. He has never used smokeless tobacco. He reports current alcohol use. He reports that he does not use drugs.    SURGICAL HISTORY  History reviewed. No pertinent surgical history.    CURRENT MEDICATIONS  Home Medications       Reviewed by Reyes Kunz R.N. (Registered Nurse) on 10/13/22 at 1717  Med List Status: Partial     Medication Last Dose Status        Patient Royal Taking any Medications                           ALLERGIES  No Known Allergies    PHYSICAL EXAM  VITAL SIGNS: /71   Pulse 81   Temp 36.9 °C (98.4 °F) (Temporal)   Resp 16   Ht 1.88 m (6' 2\")   Wt 106 kg (233 lb 7.5 oz)   SpO2 98%   BMI 29.98 kg/m²    Gen: Alert, no acute distress  HEENT: ATNC  Eyes: PERRL, EOMI, normal conjunctiva.   Neck: trachea midline  Resp: no respiratory distress, CTAB. No wheezes or crackles  CV: No JVD, RRR. No M/R/G. Equal radial pulses  Abd: non-distended, non-tender  Ext: No deformities. No pedal edema, no calf tenderness  Psych: normal mood  Neuro: speech fluent       DIAGNOSTIC STUDIES / PROCEDURES     EKG  Results for orders placed or performed during the hospital encounter of 10/13/22   EKG   Result Value Ref Range    Report       [facility].    Test Date:  2022-10-13  Pt Name:    HEMRELINDA ALBERT                Department: ER  MRN:        [id number]                      Room:  Gender:     Male                         [facility]  [facility]        [id number]                   Requested By:ER TRIAGE PROTOCOL  Order #:    [id number]                    Reading MD: Mich Garcia    Measurements  Intervals                                Axis  Rate:       78                           P:          49  IA:         187                          QRS:        76  QRSD:       88                           T:          5  QT:         367  QTc:        419    Interpretive " Statements  Sinus rhythm  Consider left ventricular hypertrophy  ST elev, probable normal early repol pattern  Compared to ECG 09/21/2021 14:52:28  ST (T wave) deviation now present  Electronically Signed On 10- 18:32:51 PDT by Mich Garcia          LABS  Labs Reviewed   CBC WITH DIFFERENTIAL - Abnormal; Notable for the following components:       Result Value    WBC 4.2 (*)     Neutrophils-Polys 40.70 (*)     Lymphocytes 51.00 (*)     Neutrophils (Absolute) 1.70 (*)     All other components within normal limits    Narrative:     Biotin intake of greater than 5 mg per day may interfere with  troponin levels, causing false low values.   COMP METABOLIC PANEL - Abnormal; Notable for the following components:    Glucose 127 (*)     All other components within normal limits    Narrative:     Biotin intake of greater than 5 mg per day may interfere with  troponin levels, causing false low values.   TROPONIN    Narrative:     Biotin intake of greater than 5 mg per day may interfere with  troponin levels, causing false low values.   ESTIMATED GFR    Narrative:     Biotin intake of greater than 5 mg per day may interfere with  troponin levels, causing false low values.     All labs reviewed by me.    RADIOLOGY  DX-CHEST-PORTABLE (1 VIEW)   Final Result      Negative single view of the chest.        The radiologist’s interpretation of all radiology studies have been reviewed by me.    COURSE & MEDICAL DECISION MAKING  Pertinent Labs & Imaging studies reviewed. (See chart for details)    6:35 PM Patient seen and examined at bedside. Ordered for labs and imaging to evaluate. Conducted bedside ultrasound; the ultrasound is unremarkable. His labs look good and there is no sign of heart attack. We are currently waiting for the results from radiology.  Patient verbalizes understanding and agreement to this plan of care.     7:08 PM - I discussed plan for discharge and follow up as outlined below. The patient is stable for  discharge at this time and will return for any new or worsening symptoms. Patient verbalizes understanding and support with my plan for discharge.       Medical Decision Making:  Patient presents with intermittent chest pain and ongoing shortness of breath.  His work-up is reassuring.  EKG demonstrates benign early repolarization.  Chest x-ray is negative.  Patient is PE RC negative for pulmonary embolism.  Clinically not consistent with aortic dissection.  Low suspicion for acid reflux, however did discuss this and the patient may trial Maalox at home.  No evidence for asthma exacerbation.     The patient will return for new or worsening symptoms and is stable at the time of discharge.    The patient is referred to a primary physician for blood pressure management, diabetic screening, and for all other preventative health concerns.      DISPOSITION:  Patient will be discharged home in stable condition.    FOLLOW UP:  Your regular doctor.  To establish a primary care provider within our system, please call [phone]          FINAL IMPRESSION  1. Chest pain, unspecified type            I, Zina Crane (Silver), am scribing for, and in the presence of, Mich Garcia M.D..    Electronically signed by: Zina Crane (Silver), 10/13/2022    IMich M.D. personally performed the services described in this documentation, as scribed by Zina Crane in my presence, and it is both accurate and complete.    The note accurately reflects work and decisions made by me.  Mich Garcia M.D.  10/14/2022  1:18 AM

## 2023-02-08 ENCOUNTER — APPOINTMENT (OUTPATIENT)
Dept: SLEEP MEDICINE | Facility: MEDICAL CENTER | Age: 38
End: 2023-02-08
Payer: COMMERCIAL